# Patient Record
Sex: MALE | Race: WHITE | Employment: UNEMPLOYED | URBAN - METROPOLITAN AREA
[De-identification: names, ages, dates, MRNs, and addresses within clinical notes are randomized per-mention and may not be internally consistent; named-entity substitution may affect disease eponyms.]

---
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
## 2023-11-16 NOTE — ROUTINE PROCESS
Dear Parents and Families,      Welcome to the [address] [place] [address] Pediatric Unit. During your stay here, our goal is to provide excellent care to your child. We would like to take this opportunity to review the unit. Arnaldo Marshall uses electronic medical records. During your stay, the nurses and physicians will document on the work station on [facility]) located in your childs room. These computers are reserved for the medical team only.  Nurses will deliver change of shift report at the bedside. This is a time where the nurses will update each other regarding the care of your child and introduce the oncoming nurse. As a part of the family centered care model we encourage you to participate in this handoff.  To promote privacy when you or a family member calls to check on your child an information code is needed.   o Your childs patient information code: [id number]  o Pediatric nurses station phone number: [phone]  o Your room phone number: [phone] In order to ensure the safety of your child the pediatric unit has several security measures in place. o The pediatric unit is a locked unit; all visitors must identify themselves prior to entering.    o Security tags are placed on all patients under the age of 10 years. Please do not attempt to loosen or remove the tag.   o All staff members should wear proper identification. This includes an \"Sabino bear Logo\" in the top corner of their pink hospital badge.   o If you are leaving your child, please notify a member of the care team before you leave.  Tips for Preventing Pediatric Falls:  o Ensure at least 2 side rails are raised in cribs and beds. Beds should always be in the lowest position. o Raise crib side rails completely when leaving your child in their crib, even if stepping away for just a moment.   o Always make sure crib rails are securely locked in place.  o Keep the area on both sides of the bed free of clutter.  o Your child should wear shoes or non-skid slippers when walking. Ask your nurse for a pair non-skid socks.   o Your child is not permitted to sleep with you in the sleeper chair. If you feel sleepy, place your child in the crib/bed.  o Your child is not permitted to stand or climb on furniture, window natalie, the wagon, or IV poles. o Before allowing the child out of bed for the first time, call your nurse to the room. o Use caution with cords, wires, and IV lines. Call your nurse before allowing your child to get out of bed.  o Ask your nurse about any medication side effects that could make your child dizzy or unsteady on their feet.  o If you must leave your child, ensure side rails are raised and inform a staff member about your departure.  Infection control is an important part of your childs hospitalization. We are asking for your cooperation in keeping your child, other patients, and the community safe from the spread of illness by doing the following.  o The soap and hand  in patient rooms are for everyone  wash (for at least 15 seconds) or sanitize your hands when entering and leaving the room of your child to avoid bringing in and carrying out germs. Ask that healthcare providers do the same before caring for your child. Clean your hands after sneezing, coughing, touching your eyes, nose, or mouth, after using the restroom and before and after eating and drinking. o If your child is placed on isolation precautions upon admission or at any time during their hospitalization, we may ask that you and or any visitors wear any protective clothing, gloves and or masks that maybe needed. o We welcome healthy family and friends to visit.      Overview of the unit:   Patient ID band   Staff ID virgilio   TV   Call bell   Emergency call [facility] Parent communication note   Equipment alarms   Kitchen   Rapid Response Team   Child Life   Bed controls   Movies   Phone  [employer] program   Saving diapers/urine   Semi-private rooms   Quiet time  [employer] hours 6:30a-7:00p   Guest tray    Patients cannot leave the floor    We appreciate your cooperation in helping us provide excellent and family centered care. If you have any questions or concerns please contact your nurse or ask to speak to the nurse manager at [phone].      Thank you,   Pediatric Team    I have reviewed the above information with the caregiver and provided a printed copy Opt out

## (undated) DEVICE — CATH IV AUTOGRD BC BLU 22GA 25 -- INSYTE

## (undated) DEVICE — DRAPE,LAPAROTOMY,T,PEDI,STERILE: Brand: MEDLINE

## (undated) DEVICE — NEEDLE HYPO 25GA L1.5IN BVL ORIENTED ECLIPSE

## (undated) DEVICE — KENDALL RADIOLUCENT FOAM MONITORING ELECTRODE -RECTANGULAR SHAPE: Brand: KENDALL

## (undated) DEVICE — SUTURE VCRL SZ 2-0 L27IN ABSRB VLT L26MM UR-6 5/8 CIR J602H

## (undated) DEVICE — Device: Brand: MEDICAL ACTION INDUSTRIES

## (undated) DEVICE — DEVON™ KNEE AND BODY STRAP 60" X 3" (1.5 M X 7.6 CM): Brand: DEVON

## (undated) DEVICE — BAG SPEC BIOHZD LF 2MIL 6X10IN -- CONVERT TO ITEM 357326

## (undated) DEVICE — Z DISCONTINUED USE 2751540 TUBING IRRIG L10IN DISP PMP ENDOGATOR

## (undated) DEVICE — FORCEPS BX L240CM JAW DIA2.8MM L CAP W/ NDL MIC MESH TOOTH

## (undated) DEVICE — SYR 5ML 1/5 GRAD LL NSAF LF --

## (undated) DEVICE — Z DISCONTINUED NO SUB IDED SET EXTN W/ 4 W STPCOCK M SPIN LOK 36IN

## (undated) DEVICE — APPLICATOR BNDG 1MM ADH PREMIERPRO EXOFIN

## (undated) DEVICE — ASTOUND STANDARD SURGICAL GOWN, XXL: Brand: CONVERTORS

## (undated) DEVICE — ENDO CARRY-ON PROCEDURE KIT INCLUDES ENZYMATIC SPONGE, GAUZE, BIOHAZARD LABEL, TRAY, LUBRICANT, DIRTY SCOPE LABEL, WATER LABEL, TRAY, DRAWSTRING PAD, AND DEFENDO 4-PIECE KIT.: Brand: ENDO CARRY-ON PROCEDURE KIT

## (undated) DEVICE — SOLUTION IV 1000ML 0.9% SOD CHL

## (undated) DEVICE — STERILE POLYISOPRENE POWDER-FREE SURGICAL GLOVES WITH EMOLLIENT COATING: Brand: PROTEXIS

## (undated) DEVICE — HANDLE LT SNAP ON ULT DURABLE LENS FOR TRUMPF ALC DISPOSABLE

## (undated) DEVICE — SOLIDIFIER FLUID 3000 CC ABSORB

## (undated) DEVICE — 1200 GUARD II KIT W/5MM TUBE W/O VAC TUBE: Brand: GUARDIAN

## (undated) DEVICE — SYRINGE MED 20ML STD CLR PLAS LUERLOCK TIP N CTRL DISP

## (undated) DEVICE — SUTURE MCRYL SZ 5-0 L27IN ABSRB UD L13MM TF 1/2 CIR Y433H

## (undated) DEVICE — BW-412T DISP COMBO CLEANING BRUSH: Brand: SINGLE USE COMBINATION CLEANING BRUSH

## (undated) DEVICE — E-Z CLEAN, PTFE COATED, ELECTROSURGICAL LAPAROSCOPIC ELECTRODE, L-HOOK, 33 CM., SINGLE-USE, FOR USE WITH HAND CONTROL PENCIL: Brand: MEGADYNE

## (undated) DEVICE — SET ADMIN 16ML TBNG L100IN 2 Y INJ SITE IV PIGGY BK DISP

## (undated) DEVICE — INTENDED FOR TISSUE SEPARATION, AND OTHER PROCEDURES THAT REQUIRE A SHARP SURGICAL BLADE TO PUNCTURE OR CUT.: Brand: BARD-PARKER ® CARBON RIB-BACK BLADES

## (undated) DEVICE — Device

## (undated) DEVICE — TUBING INSUFLTN 10FT LUER -- CONVERT TO ITEM 368568

## (undated) DEVICE — NEEDLE HYPO 18GA L1.5IN PNK S STL HUB POLYPR SHLD REG BVL

## (undated) DEVICE — CONNECTOR TBNG AUX H2O JET DISP FOR OLY 160/180 SER

## (undated) DEVICE — TRAY PREP DRY W/ PREM GLV 2 APPL 6 SPNG 2 UNDPD 1 OVERWRAP

## (undated) DEVICE — SOL ANTI-FOG 6ML MEDC -- MEDICHOICE - CONVERT TO 358427

## (undated) DEVICE — REM POLYHESIVE ADULT PATIENT RETURN ELECTRODE: Brand: VALLEYLAB

## (undated) DEVICE — CANN NASAL O2 CAPNOGRAPHY AD -- FILTERLINE

## (undated) DEVICE — AIRLIFE™ U/CONNECT-IT OXYGEN TUBING 7 FEET (2.1 M) CRUSH-RESISTANT OXYGEN TUBING, VINYL TIPPED: Brand: AIRLIFE™

## (undated) DEVICE — BLADELESS OPTICAL TROCAR WITH FIXATION CANNULA: Brand: VERSAPORT

## (undated) DEVICE — QUILTED PREMIUM COMFORT UNDERPAD,EXTRA HEAVY: Brand: WINGS

## (undated) DEVICE — DISSECTOR: Brand: ENDO DISSECT

## (undated) DEVICE — INFECTION CONTROL KIT SYS

## (undated) DEVICE — BAG BELONG PT PERS CLEAR HANDL

## (undated) DEVICE — CONTAINER SPEC 20 ML LID NEUT BUFF FORMALIN 10 % POLYPR STS